# Patient Record
Sex: FEMALE | Race: WHITE | HISPANIC OR LATINO | URBAN - METROPOLITAN AREA
[De-identification: names, ages, dates, MRNs, and addresses within clinical notes are randomized per-mention and may not be internally consistent; named-entity substitution may affect disease eponyms.]

---

## 2017-02-14 ENCOUNTER — OUTPATIENT (OUTPATIENT)
Dept: OUTPATIENT SERVICES | Facility: HOSPITAL | Age: 22
LOS: 1 days | End: 2017-02-14
Payer: COMMERCIAL

## 2017-02-14 DIAGNOSIS — Z01.818 ENCOUNTER FOR OTHER PREPROCEDURAL EXAMINATION: ICD-10-CM

## 2017-02-14 DIAGNOSIS — O34.219 MATERNAL CARE FOR UNSPECIFIED TYPE SCAR FROM PREVIOUS CESAREAN DELIVERY: ICD-10-CM

## 2017-02-14 LAB
APPEARANCE UR: CLEAR — SIGNIFICANT CHANGE UP
BASOPHILS # BLD AUTO: 0 K/UL — SIGNIFICANT CHANGE UP (ref 0–0.2)
BASOPHILS NFR BLD AUTO: 0.1 % — SIGNIFICANT CHANGE UP (ref 0–2)
BILIRUB UR-MCNC: NEGATIVE — SIGNIFICANT CHANGE UP
BLD GP AB SCN SERPL QL: SIGNIFICANT CHANGE UP
COLOR SPEC: YELLOW — SIGNIFICANT CHANGE UP
COMMENT - BLOOD BANK: SIGNIFICANT CHANGE UP
DIFF PNL FLD: NEGATIVE — SIGNIFICANT CHANGE UP
EOSINOPHIL # BLD AUTO: 0 K/UL — SIGNIFICANT CHANGE UP (ref 0–0.5)
EOSINOPHIL NFR BLD AUTO: 0.4 % — SIGNIFICANT CHANGE UP (ref 0–6)
GLUCOSE UR QL: NEGATIVE MG/DL — SIGNIFICANT CHANGE UP
HCT VFR BLD CALC: 37.9 % — SIGNIFICANT CHANGE UP (ref 37–47)
HGB BLD-MCNC: 13 G/DL — SIGNIFICANT CHANGE UP (ref 12–16)
KETONES UR-MCNC: NEGATIVE — SIGNIFICANT CHANGE UP
LEUKOCYTE ESTERASE UR-ACNC: NEGATIVE — SIGNIFICANT CHANGE UP
LYMPHOCYTES # BLD AUTO: 1.9 K/UL — SIGNIFICANT CHANGE UP (ref 1–4.8)
LYMPHOCYTES # BLD AUTO: 17.7 % — LOW (ref 20–55)
MCHC RBC-ENTMCNC: 33 PG — HIGH (ref 27–31)
MCHC RBC-ENTMCNC: 34.3 G/DL — SIGNIFICANT CHANGE UP (ref 32–36)
MCV RBC AUTO: 96.2 FL — SIGNIFICANT CHANGE UP (ref 81–99)
MONOCYTES # BLD AUTO: 0.8 K/UL — SIGNIFICANT CHANGE UP (ref 0–0.8)
MONOCYTES NFR BLD AUTO: 7.7 % — SIGNIFICANT CHANGE UP (ref 3–10)
NEUTROPHILS # BLD AUTO: 7.8 K/UL — SIGNIFICANT CHANGE UP (ref 1.8–8)
NEUTROPHILS NFR BLD AUTO: 73.7 % — HIGH (ref 37–73)
NITRITE UR-MCNC: NEGATIVE — SIGNIFICANT CHANGE UP
PH UR: 7 — SIGNIFICANT CHANGE UP (ref 4.8–8)
PLATELET # BLD AUTO: 153 K/UL — SIGNIFICANT CHANGE UP (ref 150–400)
PROT UR-MCNC: NEGATIVE MG/DL — SIGNIFICANT CHANGE UP
RBC # BLD: 3.94 M/UL — LOW (ref 4.4–5.2)
RBC # FLD: 13.3 % — SIGNIFICANT CHANGE UP (ref 11–15.6)
SP GR SPEC: 1 — LOW (ref 1.01–1.02)
TYPE + AB SCN PNL BLD: SIGNIFICANT CHANGE UP
UROBILINOGEN FLD QL: NEGATIVE MG/DL — SIGNIFICANT CHANGE UP
WBC # BLD: 10.6 K/UL — SIGNIFICANT CHANGE UP (ref 4.8–10.8)
WBC # FLD AUTO: 10.6 K/UL — SIGNIFICANT CHANGE UP (ref 4.8–10.8)

## 2017-02-14 PROCEDURE — 85027 COMPLETE CBC AUTOMATED: CPT

## 2017-02-14 PROCEDURE — 86850 RBC ANTIBODY SCREEN: CPT

## 2017-02-14 PROCEDURE — 81003 URINALYSIS AUTO W/O SCOPE: CPT

## 2017-02-14 PROCEDURE — 86901 BLOOD TYPING SEROLOGIC RH(D): CPT

## 2017-02-14 PROCEDURE — 36415 COLL VENOUS BLD VENIPUNCTURE: CPT

## 2017-02-14 PROCEDURE — 86900 BLOOD TYPING SEROLOGIC ABO: CPT

## 2017-02-15 DIAGNOSIS — O34.219 MATERNAL CARE FOR UNSPECIFIED TYPE SCAR FROM PREVIOUS CESAREAN DELIVERY: ICD-10-CM

## 2017-02-15 DIAGNOSIS — Z01.818 ENCOUNTER FOR OTHER PREPROCEDURAL EXAMINATION: ICD-10-CM

## 2017-02-26 ENCOUNTER — TRANSCRIPTION ENCOUNTER (OUTPATIENT)
Age: 22
End: 2017-02-26

## 2017-02-27 ENCOUNTER — INPATIENT (INPATIENT)
Facility: HOSPITAL | Age: 22
LOS: 2 days | Discharge: HOME | End: 2017-03-02
Attending: OBSTETRICS & GYNECOLOGY | Admitting: OBSTETRICS & GYNECOLOGY
Payer: COMMERCIAL

## 2017-02-27 VITALS — WEIGHT: 145.51 LBS

## 2017-02-27 DIAGNOSIS — O34.219 MATERNAL CARE FOR UNSPECIFIED TYPE SCAR FROM PREVIOUS CESAREAN DELIVERY: ICD-10-CM

## 2017-02-27 LAB
BASE EXCESS BLDCOV CALC-SCNC: 0.1 MMOL/L — SIGNIFICANT CHANGE UP (ref -9.3–0.3)
BASOPHILS # BLD AUTO: 0 K/UL — SIGNIFICANT CHANGE UP (ref 0–0.2)
BASOPHILS NFR BLD AUTO: 0.1 % — SIGNIFICANT CHANGE UP (ref 0–2)
BLD GP AB SCN SERPL QL: SIGNIFICANT CHANGE UP
GAS PNL BLDCOV: 7.33 — SIGNIFICANT CHANGE UP (ref 7.11–7.36)
HCO3 BLDCOV-SCNC: 27 MMOL/L — HIGH (ref 17–25)
HCT VFR BLD CALC: 35.1 % — LOW (ref 37–47)
HGB BLD-MCNC: 12.4 G/DL — SIGNIFICANT CHANGE UP (ref 12–16)
LYMPHOCYTES # BLD AUTO: 1.1 K/UL — SIGNIFICANT CHANGE UP (ref 1–4.8)
LYMPHOCYTES # BLD AUTO: 6.1 % — LOW (ref 20–55)
MCHC RBC-ENTMCNC: 33.6 PG — HIGH (ref 27–31)
MCHC RBC-ENTMCNC: 35.3 G/DL — SIGNIFICANT CHANGE UP (ref 32–36)
MCV RBC AUTO: 95.1 FL — SIGNIFICANT CHANGE UP (ref 81–99)
MONOCYTES # BLD AUTO: 0.6 K/UL — SIGNIFICANT CHANGE UP (ref 0–0.8)
MONOCYTES NFR BLD AUTO: 3.1 % — SIGNIFICANT CHANGE UP (ref 3–10)
NEUTROPHILS # BLD AUTO: 16 K/UL — HIGH (ref 1.8–8)
NEUTROPHILS NFR BLD AUTO: 90.5 % — HIGH (ref 37–73)
PCO2 BLDCOV: 51 MMHG — HIGH (ref 27–49.4)
PLAT MORPH BLD: NORMAL — SIGNIFICANT CHANGE UP
PLATELET # BLD AUTO: 133 K/UL — LOW (ref 150–400)
PO2 BLDCOA: 18.9 MMHG — SIGNIFICANT CHANGE UP (ref 17.4–41)
RBC # BLD: 3.69 M/UL — LOW (ref 4.4–5.2)
RBC # FLD: 12.6 % — SIGNIFICANT CHANGE UP (ref 11–15.6)
RBC BLD AUTO: NORMAL — SIGNIFICANT CHANGE UP
RPR SERPL-ACNC: SIGNIFICANT CHANGE UP
SAO2 % BLDCOV: SIGNIFICANT CHANGE UP
TYPE + AB SCN PNL BLD: SIGNIFICANT CHANGE UP
WBC # BLD: 17.7 K/UL — HIGH (ref 4.8–10.8)
WBC # FLD AUTO: 17.7 K/UL — HIGH (ref 4.8–10.8)

## 2017-02-27 RX ORDER — ACETAMINOPHEN 500 MG
650 TABLET ORAL EVERY 6 HOURS
Qty: 0 | Refills: 0 | Status: DISCONTINUED | OUTPATIENT
Start: 2017-02-27 | End: 2017-02-27

## 2017-02-27 RX ORDER — OXYTOCIN 10 UNIT/ML
41.67 VIAL (ML) INJECTION
Qty: 20 | Refills: 0 | Status: DISCONTINUED | OUTPATIENT
Start: 2017-02-27 | End: 2017-03-02

## 2017-02-27 RX ORDER — GLYCERIN ADULT
1 SUPPOSITORY, RECTAL RECTAL AT BEDTIME
Qty: 0 | Refills: 0 | Status: DISCONTINUED | OUTPATIENT
Start: 2017-02-27 | End: 2017-02-27

## 2017-02-27 RX ORDER — IBUPROFEN 200 MG
600 TABLET ORAL EVERY 6 HOURS
Qty: 0 | Refills: 0 | Status: DISCONTINUED | OUTPATIENT
Start: 2017-02-27 | End: 2017-02-27

## 2017-02-27 RX ORDER — SODIUM CHLORIDE 9 MG/ML
1000 INJECTION, SOLUTION INTRAVENOUS ONCE
Qty: 0 | Refills: 0 | Status: COMPLETED | OUTPATIENT
Start: 2017-02-27 | End: 2017-02-27

## 2017-02-27 RX ORDER — DIPHENHYDRAMINE HCL 50 MG
25 CAPSULE ORAL ONCE
Qty: 0 | Refills: 0 | Status: DISCONTINUED | OUTPATIENT
Start: 2017-02-27 | End: 2017-03-02

## 2017-02-27 RX ORDER — IBUPROFEN 200 MG
600 TABLET ORAL EVERY 6 HOURS
Qty: 0 | Refills: 0 | Status: DISCONTINUED | OUTPATIENT
Start: 2017-02-27 | End: 2017-03-02

## 2017-02-27 RX ORDER — OXYTOCIN 10 UNIT/ML
41.67 VIAL (ML) INJECTION
Qty: 20 | Refills: 0 | Status: DISCONTINUED | OUTPATIENT
Start: 2017-02-27 | End: 2017-02-27

## 2017-02-27 RX ORDER — LANOLIN
1 OINTMENT (GRAM) TOPICAL
Qty: 0 | Refills: 0 | Status: DISCONTINUED | OUTPATIENT
Start: 2017-02-27 | End: 2017-03-02

## 2017-02-27 RX ORDER — CEFAZOLIN SODIUM 1 G
2000 VIAL (EA) INJECTION ONCE
Qty: 0 | Refills: 0 | Status: COMPLETED | OUTPATIENT
Start: 2017-02-27 | End: 2017-02-27

## 2017-02-27 RX ORDER — KETOROLAC TROMETHAMINE 30 MG/ML
30 SYRINGE (ML) INJECTION EVERY 6 HOURS
Qty: 0 | Refills: 0 | Status: DISCONTINUED | OUTPATIENT
Start: 2017-02-27 | End: 2017-02-27

## 2017-02-27 RX ORDER — DIPHENHYDRAMINE HCL 50 MG
25 CAPSULE ORAL EVERY 6 HOURS
Qty: 0 | Refills: 0 | Status: DISCONTINUED | OUTPATIENT
Start: 2017-02-27 | End: 2017-03-02

## 2017-02-27 RX ORDER — METOCLOPRAMIDE HCL 10 MG
10 TABLET ORAL ONCE
Qty: 0 | Refills: 0 | Status: DISCONTINUED | OUTPATIENT
Start: 2017-02-27 | End: 2017-02-27

## 2017-02-27 RX ORDER — SODIUM CHLORIDE 9 MG/ML
1000 INJECTION, SOLUTION INTRAVENOUS
Qty: 0 | Refills: 0 | Status: DISCONTINUED | OUTPATIENT
Start: 2017-02-27 | End: 2017-03-02

## 2017-02-27 RX ORDER — LANOLIN
1 OINTMENT (GRAM) TOPICAL
Qty: 0 | Refills: 0 | Status: DISCONTINUED | OUTPATIENT
Start: 2017-02-27 | End: 2017-02-27

## 2017-02-27 RX ORDER — FERROUS SULFATE 325(65) MG
325 TABLET ORAL DAILY
Qty: 0 | Refills: 0 | Status: DISCONTINUED | OUTPATIENT
Start: 2017-02-27 | End: 2017-02-27

## 2017-02-27 RX ORDER — ACETAMINOPHEN 500 MG
1000 TABLET ORAL ONCE
Qty: 0 | Refills: 0 | Status: COMPLETED | OUTPATIENT
Start: 2017-02-27 | End: 2017-02-27

## 2017-02-27 RX ORDER — SODIUM CHLORIDE 9 MG/ML
1000 INJECTION, SOLUTION INTRAVENOUS
Qty: 0 | Refills: 0 | Status: DISCONTINUED | OUTPATIENT
Start: 2017-02-27 | End: 2017-02-27

## 2017-02-27 RX ORDER — DIPHENHYDRAMINE HCL 50 MG
25 CAPSULE ORAL EVERY 6 HOURS
Qty: 0 | Refills: 0 | Status: DISCONTINUED | OUTPATIENT
Start: 2017-02-27 | End: 2017-02-27

## 2017-02-27 RX ORDER — TETANUS TOXOID, REDUCED DIPHTHERIA TOXOID AND ACELLULAR PERTUSSIS VACCINE, ADSORBED 5; 2.5; 8; 8; 2.5 [IU]/.5ML; [IU]/.5ML; UG/.5ML; UG/.5ML; UG/.5ML
0.5 SUSPENSION INTRAMUSCULAR ONCE
Qty: 0 | Refills: 0 | Status: DISCONTINUED | OUTPATIENT
Start: 2017-02-27 | End: 2017-03-02

## 2017-02-27 RX ORDER — SIMETHICONE 80 MG/1
80 TABLET, CHEWABLE ORAL EVERY 4 HOURS
Qty: 0 | Refills: 0 | Status: DISCONTINUED | OUTPATIENT
Start: 2017-02-27 | End: 2017-02-27

## 2017-02-27 RX ORDER — OXYTOCIN 10 UNIT/ML
333.33 VIAL (ML) INJECTION
Qty: 20 | Refills: 0 | Status: DISCONTINUED | OUTPATIENT
Start: 2017-02-27 | End: 2017-03-02

## 2017-02-27 RX ORDER — SIMETHICONE 80 MG/1
80 TABLET, CHEWABLE ORAL EVERY 4 HOURS
Qty: 0 | Refills: 0 | Status: DISCONTINUED | OUTPATIENT
Start: 2017-02-27 | End: 2017-03-02

## 2017-02-27 RX ORDER — CITRIC ACID/SODIUM CITRATE 300-500 MG
30 SOLUTION, ORAL ORAL ONCE
Qty: 0 | Refills: 0 | Status: COMPLETED | OUTPATIENT
Start: 2017-02-27 | End: 2017-02-27

## 2017-02-27 RX ORDER — ONDANSETRON 8 MG/1
4 TABLET, FILM COATED ORAL EVERY 6 HOURS
Qty: 0 | Refills: 0 | Status: DISCONTINUED | OUTPATIENT
Start: 2017-02-27 | End: 2017-03-02

## 2017-02-27 RX ORDER — DOCUSATE SODIUM 100 MG
100 CAPSULE ORAL
Qty: 0 | Refills: 0 | Status: DISCONTINUED | OUTPATIENT
Start: 2017-02-27 | End: 2017-03-02

## 2017-02-27 RX ORDER — FERROUS SULFATE 325(65) MG
325 TABLET ORAL DAILY
Qty: 0 | Refills: 0 | Status: DISCONTINUED | OUTPATIENT
Start: 2017-02-27 | End: 2017-03-02

## 2017-02-27 RX ORDER — DIPHENHYDRAMINE HCL 50 MG
25 CAPSULE ORAL EVERY 4 HOURS
Qty: 0 | Refills: 0 | Status: DISCONTINUED | OUTPATIENT
Start: 2017-02-27 | End: 2017-03-02

## 2017-02-27 RX ORDER — KETOROLAC TROMETHAMINE 30 MG/ML
30 SYRINGE (ML) INJECTION ONCE
Qty: 0 | Refills: 0 | Status: DISCONTINUED | OUTPATIENT
Start: 2017-02-27 | End: 2017-02-27

## 2017-02-27 RX ORDER — ONDANSETRON 8 MG/1
4 TABLET, FILM COATED ORAL ONCE
Qty: 0 | Refills: 0 | Status: DISCONTINUED | OUTPATIENT
Start: 2017-02-27 | End: 2017-02-27

## 2017-02-27 RX ORDER — ACETAMINOPHEN 500 MG
650 TABLET ORAL EVERY 6 HOURS
Qty: 0 | Refills: 0 | Status: DISCONTINUED | OUTPATIENT
Start: 2017-02-27 | End: 2017-03-02

## 2017-02-27 RX ORDER — DOCUSATE SODIUM 100 MG
100 CAPSULE ORAL
Qty: 0 | Refills: 0 | Status: DISCONTINUED | OUTPATIENT
Start: 2017-02-27 | End: 2017-02-27

## 2017-02-27 RX ORDER — GLYCERIN ADULT
1 SUPPOSITORY, RECTAL RECTAL AT BEDTIME
Qty: 0 | Refills: 0 | Status: DISCONTINUED | OUTPATIENT
Start: 2017-02-27 | End: 2017-03-02

## 2017-02-27 RX ORDER — NALOXONE HYDROCHLORIDE 4 MG/.1ML
0.1 SPRAY NASAL
Qty: 0 | Refills: 0 | Status: DISCONTINUED | OUTPATIENT
Start: 2017-02-27 | End: 2017-03-02

## 2017-02-27 RX ADMIN — Medication 125 MILLIUNIT(S)/MIN: at 08:54

## 2017-02-27 RX ADMIN — Medication 325 MILLIGRAM(S): at 16:47

## 2017-02-27 RX ADMIN — SODIUM CHLORIDE 2000 MILLILITER(S): 9 INJECTION, SOLUTION INTRAVENOUS at 07:22

## 2017-02-27 RX ADMIN — Medication 30 MILLILITER(S): at 06:43

## 2017-02-27 RX ADMIN — Medication 400 MILLIGRAM(S): at 09:58

## 2017-02-27 RX ADMIN — Medication 1 TABLET(S): at 16:46

## 2017-02-27 RX ADMIN — SODIUM CHLORIDE 125 MILLILITER(S): 9 INJECTION, SOLUTION INTRAVENOUS at 17:24

## 2017-02-27 RX ADMIN — Medication 1000 MILLIUNIT(S)/MIN: at 08:54

## 2017-02-27 RX ADMIN — Medication 100 MILLIGRAM(S): at 07:53

## 2017-02-27 RX ADMIN — Medication 1000 MILLIGRAM(S): at 10:15

## 2017-02-27 RX ADMIN — Medication 125 MILLIUNIT(S)/MIN: at 12:23

## 2017-02-27 NOTE — DISCHARGE NOTE OB - CARE PLAN
Principal Discharge DX:	 delivery delivered  Goal:	Healthy Mother and Baby  Instructions for follow-up, activity and diet:	- Recommended early ambulation, adequate hydration and a balanced diet. Mother-baby interaction also encouraged and breast feeding ad ethan.    - Pelvic rest × 6 weeks  - Postpartum suture/staple removal in 4-5 days  - Postpartum check in 4–6 weeks with PMD

## 2017-02-27 NOTE — DISCHARGE NOTE OB - HOSPITAL COURSE
20yo now  had a repeat C/S at 39 wks. Labor course was without any complications and delivery was uncomplicated. Patient did well in recovery and was transferred to post partum floor. Post partum course was unremarkable. Patient to follow up at [facility] in 4 to 5 days for suture/staple removal and in 6 weeks for postpartum check up. Patient is in medically satisfactory condition for discharged home.

## 2017-02-27 NOTE — DISCHARGE NOTE OB - CARE PROVIDER_API CALL
Fredrick Major (MD), Obstetrics and Gynecology  [address] [place] [address]  Phone: [phone]  Fax: [phone]

## 2017-02-27 NOTE — DISCHARGE NOTE OB - MEDICATION SUMMARY - MEDICATIONS TO TAKE
I will START or STAY ON the medications listed below when I get home from the hospital:    Prenatal Multivitamins with Folic Acid 1 mg oral tablet  -- 1 tab(s) by mouth once a day  -- Indication: For Supplementation

## 2017-02-27 NOTE — DISCHARGE NOTE OB - PLAN OF CARE
Healthy Mother and Baby - Recommended early ambulation, adequate hydration and a balanced diet. Mother-baby interaction also encouraged and breast feeding ad ethan.    - Pelvic rest × 6 weeks  - Postpartum suture/staple removal in 4-5 days  - Postpartum check in 4–6 weeks with PMD

## 2017-02-27 NOTE — DISCHARGE NOTE OB - PATIENT PORTAL LINK FT
“You can access the [facility] Patient Portal, offered by [facility], by registering with the following website: [email or web address]”

## 2017-02-28 RX ADMIN — Medication 600 MILLIGRAM(S): at 20:40

## 2017-02-28 RX ADMIN — Medication 325 MILLIGRAM(S): at 13:43

## 2017-02-28 RX ADMIN — Medication 600 MILLIGRAM(S): at 20:06

## 2017-02-28 RX ADMIN — Medication 1 TABLET(S): at 13:43

## 2017-02-28 NOTE — PROGRESS NOTE ADULT - SUBJECTIVE AND OBJECTIVE BOX
PGY 2 Progress Note:     21 year old  s/p repeat c/section at 39 weeks is POD #1.  Patient had nguyen.  She is ambulating, +flatus, tolerating PO intake well.  Patient is breast feeding.     ROS: pt denies any calf pain, denies any chest pain, denies any palpitations or SOB     Vital Signs Last 24 Hrs  T(C): 36.8, Max: 36.9 ( @ 00:18)  T(F): 98.2, Max: 98.4 ( @ 00:18)  HR: 79 (61 - 88)  BP: 115/82 (95/50 - 122/78)  BP(mean): --  RR: 18 (16 - 20)  SpO2: 99% (97% - 100%)    MEDICATIONS  (STANDING):  oxytocin Infusion 333.333milliUNIT(s)/Min IV Continuous <Continuous>  lactated ringers. 1000milliLiter(s) IV Continuous <Continuous>  diphtheria/tetanus/pertussis (acellular) Vaccine (ADAcel) 0.5milliLiter(s) IntraMuscular once  oxytocin Infusion 41.667milliUNIT(s)/Min IV Continuous <Continuous>  ferrous    sulfate 325milliGRAM(s) Oral daily  prenatal multivitamin 1Tablet(s) Oral daily  lactated ringers. 1000milliLiter(s) IV Continuous <Continuous>  diphtheria/tetanus/pertussis (acellular) Vaccine (ADAcel) 0.5milliLiter(s) IntraMuscular once    MEDICATIONS  (PRN):  diphenhydrAMINE   Injectable 25milliGRAM(s) IV Push once PRN Rash and/or Itching  naloxone Injectable 0.1milliGRAM(s) IV Push every 3 minutes PRN For ANY of the following changes in patient status:  A. RR LESS THAN 10 breaths per minute, B. Oxygen saturation LESS THAN 90%, C. Sedation score of 6  ondansetron Injectable 4milliGRAM(s) IV Push every 6 hours PRN Nausea  diphenhydrAMINE   Capsule 25milliGRAM(s) Oral every 4 hours PRN Pruritus  ketorolac   Injectable 30milliGRAM(s) IV Push every 6 hours PRN Moderate Pain (4 - 6)  acetaminophen   Tablet 650milliGRAM(s) Oral every 6 hours PRN For Temp greater than 38.5 C (101.3 F)  ibuprofen  Tablet 600milliGRAM(s) Oral every 6 hours PRN Mild pain or headache  oxyCODONE  5 mG/acetaminophen 325 mG 1Tablet(s) Oral every 3 hours PRN Moderate Pain  oxyCODONE  5 mG/acetaminophen 325 mG 2Tablet(s) Oral every 6 hours PRN Severe Pain  simethicone 80milliGRAM(s) Chew every 4 hours PRN Gas  diphenhydrAMINE   Capsule 25milliGRAM(s) Oral every 6 hours PRN Itching  glycerin Suppository - Adult 1Suppository(s) Rectal at bedtime PRN Constipation  docusate sodium 100milliGRAM(s) Oral two times a day PRN Stool Softening  lanolin Ointment 1Application(s) Topical every 3 hours PRN Sore Nipples      Physical Exam:   General: NAD   HEENT: AT/NC   CV: S1, S2   Resp: clear bilateral   Abd: soft, NT, ND, uterus at level of umbilicus   Ext: no b/l calf tenderness   Skin: staples in tact, incision clean, dry, non erythematous

## 2017-02-28 NOTE — PROGRESS NOTE ADULT - PROBLEM SELECTOR PLAN 1
continue current management   d/c nguyen   f/u AM CBC   encourage mother/baby bonding, encourage breast feeding

## 2017-02-28 NOTE — PROGRESS NOTE ADULT - ATTENDING COMMENTS
Post op day # 1  s/p repeat   pain controlled  out of bed  uterus contracted   wound intact  For routine post op / post partum care

## 2017-03-01 LAB
BASOPHILS # BLD AUTO: 0 K/UL — SIGNIFICANT CHANGE UP (ref 0–0.2)
BASOPHILS NFR BLD AUTO: 0.2 % — SIGNIFICANT CHANGE UP (ref 0–2)
EOSINOPHIL # BLD AUTO: 0.2 K/UL — SIGNIFICANT CHANGE UP (ref 0–0.5)
EOSINOPHIL NFR BLD AUTO: 1.3 % — SIGNIFICANT CHANGE UP (ref 0–6)
HCOV 229E RNA SPEC QL NAA+PROBE: DETECTED
HCT VFR BLD CALC: 34.2 % — LOW (ref 37–47)
HGB BLD-MCNC: 11.9 G/DL — LOW (ref 12–16)
LYMPHOCYTES # BLD AUTO: 17.2 % — LOW (ref 20–55)
LYMPHOCYTES # BLD AUTO: 2.1 K/UL — SIGNIFICANT CHANGE UP (ref 1–4.8)
MCHC RBC-ENTMCNC: 33.6 PG — HIGH (ref 27–31)
MCHC RBC-ENTMCNC: 34.8 G/DL — SIGNIFICANT CHANGE UP (ref 32–36)
MCV RBC AUTO: 96.6 FL — SIGNIFICANT CHANGE UP (ref 81–99)
MONOCYTES # BLD AUTO: 1 K/UL — HIGH (ref 0–0.8)
MONOCYTES NFR BLD AUTO: 8.4 % — SIGNIFICANT CHANGE UP (ref 3–10)
NEUTROPHILS # BLD AUTO: 8.9 K/UL — HIGH (ref 1.8–8)
NEUTROPHILS NFR BLD AUTO: 72.6 % — SIGNIFICANT CHANGE UP (ref 37–73)
PLATELET # BLD AUTO: 121 K/UL — LOW (ref 150–400)
RAPID RVP RESULT: DETECTED
RBC # BLD: 3.54 M/UL — LOW (ref 4.4–5.2)
RBC # FLD: 13.5 % — SIGNIFICANT CHANGE UP (ref 11–15.6)
WBC # BLD: 12.3 K/UL — HIGH (ref 4.8–10.8)
WBC # FLD AUTO: 12.3 K/UL — HIGH (ref 4.8–10.8)

## 2017-03-01 RX ADMIN — Medication 325 MILLIGRAM(S): at 12:34

## 2017-03-01 RX ADMIN — Medication 1 TABLET(S): at 12:34

## 2017-03-01 NOTE — PROGRESS NOTE ADULT - SUBJECTIVE AND OBJECTIVE BOX
PGY 2 Progress Note:     21 year old  s/p repeat c/section at 39 weeks is POD #2.  She is ambulating, +flatus, tolerating PO intake well.  Patient is breast feeding.  Pt c/o dry non productive cough.  No rhinorrhea, no sick contacts.      ROS: pt denies any calf pain, denies any chest pain, denies any palpitations or SOB     Vital Signs Last 24 Hrs  T(C): 36.7, Max: 36.7 ( @ 07:25)  T(F): 98.1, Max: 98.1 ( @ 07:25)  HR: 82 (66 - 82)  BP: 110/77 (110/71 - 110/77)  BP(mean): --  RR: 18 (18 - 18)  SpO2: 99% (99% - 99%)        Physical Exam:   General: NAD   HEENT: AT/NC   CV: S1, S2   Resp: clear bilateral   Abd: soft, NT, ND, uterus at level of umbilicus   Ext: no b/l calf tenderness   Skin: staples in tact, incision clean, dry, non erythematous     MEDICATIONS  (STANDING):  oxytocin Infusion 333.333milliUNIT(s)/Min IV Continuous <Continuous>  lactated ringers. 1000milliLiter(s) IV Continuous <Continuous>  diphtheria/tetanus/pertussis (acellular) Vaccine (ADAcel) 0.5milliLiter(s) IntraMuscular once  oxytocin Infusion 41.667milliUNIT(s)/Min IV Continuous <Continuous>  ferrous    sulfate 325milliGRAM(s) Oral daily  prenatal multivitamin 1Tablet(s) Oral daily  lactated ringers. 1000milliLiter(s) IV Continuous <Continuous>  diphtheria/tetanus/pertussis (acellular) Vaccine (ADAcel) 0.5milliLiter(s) IntraMuscular once    MEDICATIONS  (PRN):  diphenhydrAMINE   Injectable 25milliGRAM(s) IV Push once PRN Rash and/or Itching  naloxone Injectable 0.1milliGRAM(s) IV Push every 3 minutes PRN For ANY of the following changes in patient status:  A. RR LESS THAN 10 breaths per minute, B. Oxygen saturation LESS THAN 90%, C. Sedation score of 6  ondansetron Injectable 4milliGRAM(s) IV Push every 6 hours PRN Nausea  diphenhydrAMINE   Capsule 25milliGRAM(s) Oral every 4 hours PRN Pruritus  acetaminophen   Tablet 650milliGRAM(s) Oral every 6 hours PRN For Temp greater than 38.5 C (101.3 F)  ibuprofen  Tablet 600milliGRAM(s) Oral every 6 hours PRN Mild pain or headache  oxyCODONE  5 mG/acetaminophen 325 mG 1Tablet(s) Oral every 3 hours PRN Moderate Pain  oxyCODONE  5 mG/acetaminophen 325 mG 2Tablet(s) Oral every 6 hours PRN Severe Pain  simethicone 80milliGRAM(s) Chew every 4 hours PRN Gas  diphenhydrAMINE   Capsule 25milliGRAM(s) Oral every 6 hours PRN Itching  glycerin Suppository - Adult 1Suppository(s) Rectal at bedtime PRN Constipation  docusate sodium 100milliGRAM(s) Oral two times a day PRN Stool Softening  lanolin Ointment 1Application(s) Topical every 3 hours PRN Sore Nipples PGY 2 Progress Note:     21 year old  s/p repeat c/section at 39 weeks is POD #2.  She is ambulating, +flatus, tolerating PO intake well.  Patient is breast feeding.       ROS: pt denies any calf pain, denies any chest pain, denies any palpitations or SOB     Vital Signs Last 24 Hrs  T(C): 36.7, Max: 36.7 ( @ 07:25)  T(F): 98.1, Max: 98.1 ( @ 07:25)  HR: 82 (66 - 82)  BP: 110/77 (110/71 - 110/77)  BP(mean): --  RR: 18 (18 - 18)  SpO2: 99% (99% - 99%)        Physical Exam:   General: NAD   HEENT: AT/NC   CV: S1, S2   Resp: clear bilateral   Abd: soft, NT, ND, uterus at level of umbilicus   Ext: no b/l calf tenderness   Skin: staples in tact, incision clean, dry, non erythematous     MEDICATIONS  (STANDING):  oxytocin Infusion 333.333milliUNIT(s)/Min IV Continuous <Continuous>  lactated ringers. 1000milliLiter(s) IV Continuous <Continuous>  diphtheria/tetanus/pertussis (acellular) Vaccine (ADAcel) 0.5milliLiter(s) IntraMuscular once  oxytocin Infusion 41.667milliUNIT(s)/Min IV Continuous <Continuous>  ferrous    sulfate 325milliGRAM(s) Oral daily  prenatal multivitamin 1Tablet(s) Oral daily  lactated ringers. 1000milliLiter(s) IV Continuous <Continuous>  diphtheria/tetanus/pertussis (acellular) Vaccine (ADAcel) 0.5milliLiter(s) IntraMuscular once    MEDICATIONS  (PRN):  diphenhydrAMINE   Injectable 25milliGRAM(s) IV Push once PRN Rash and/or Itching  naloxone Injectable 0.1milliGRAM(s) IV Push every 3 minutes PRN For ANY of the following changes in patient status:  A. RR LESS THAN 10 breaths per minute, B. Oxygen saturation LESS THAN 90%, C. Sedation score of 6  ondansetron Injectable 4milliGRAM(s) IV Push every 6 hours PRN Nausea  diphenhydrAMINE   Capsule 25milliGRAM(s) Oral every 4 hours PRN Pruritus  acetaminophen   Tablet 650milliGRAM(s) Oral every 6 hours PRN For Temp greater than 38.5 C (101.3 F)  ibuprofen  Tablet 600milliGRAM(s) Oral every 6 hours PRN Mild pain or headache  oxyCODONE  5 mG/acetaminophen 325 mG 1Tablet(s) Oral every 3 hours PRN Moderate Pain  oxyCODONE  5 mG/acetaminophen 325 mG 2Tablet(s) Oral every 6 hours PRN Severe Pain  simethicone 80milliGRAM(s) Chew every 4 hours PRN Gas  diphenhydrAMINE   Capsule 25milliGRAM(s) Oral every 6 hours PRN Itching  glycerin Suppository - Adult 1Suppository(s) Rectal at bedtime PRN Constipation  docusate sodium 100milliGRAM(s) Oral two times a day PRN Stool Softening  lanolin Ointment 1Application(s) Topical every 3 hours PRN Sore Nipples

## 2017-03-01 NOTE — PROGRESS NOTE ADULT - ATTENDING COMMENTS
Post op day # 2  s/p repeat  at 39 wks  uncomplicated course  no complaints  exam within limits  labs reviewed  24 hr discharge notice

## 2017-03-02 VITALS
SYSTOLIC BLOOD PRESSURE: 105 MMHG | DIASTOLIC BLOOD PRESSURE: 64 MMHG | RESPIRATION RATE: 18 BRPM | HEART RATE: 89 BPM | TEMPERATURE: 98 F

## 2017-03-02 PROCEDURE — T1013: CPT

## 2017-03-02 PROCEDURE — 87486 CHLMYD PNEUM DNA AMP PROBE: CPT

## 2017-03-02 PROCEDURE — 86901 BLOOD TYPING SEROLOGIC RH(D): CPT

## 2017-03-02 PROCEDURE — 82803 BLOOD GASES ANY COMBINATION: CPT

## 2017-03-02 PROCEDURE — 36415 COLL VENOUS BLD VENIPUNCTURE: CPT

## 2017-03-02 PROCEDURE — 87798 DETECT AGENT NOS DNA AMP: CPT

## 2017-03-02 PROCEDURE — 59050 FETAL MONITOR W/REPORT: CPT

## 2017-03-02 PROCEDURE — 87633 RESP VIRUS 12-25 TARGETS: CPT

## 2017-03-02 PROCEDURE — 85027 COMPLETE CBC AUTOMATED: CPT

## 2017-03-02 PROCEDURE — 86900 BLOOD TYPING SEROLOGIC ABO: CPT

## 2017-03-02 PROCEDURE — 87581 M.PNEUMON DNA AMP PROBE: CPT

## 2017-03-02 PROCEDURE — 86850 RBC ANTIBODY SCREEN: CPT

## 2017-03-02 PROCEDURE — 86592 SYPHILIS TEST NON-TREP QUAL: CPT

## 2017-03-02 NOTE — PROGRESS NOTE ADULT - SUBJECTIVE AND OBJECTIVE BOX
PGY 2 Progress Note:     21 year old  s/p repeat c/section at 39 weeks is POD #3.  She is ambulating, +flatus, tolerating PO intake well.  Patient is breast feeding.       ROS: pt denies any calf pain, denies any chest pain, denies any palpitations or SOB     Vital Signs Last 24 Hrs  T(C): 36.4, Max: 36.6 ( @ 07:40)  T(F): 97.5, Max: 97.9 ( @ 07:40)  HR: 100 (94 - 100)  BP: 115/83 (114/74 - 115/83)  BP(mean): --  RR: 18 (18 - 18)  SpO2: 98% (98% - 98%)      Physical Exam:   General: NAD   HEENT: AT/NC   CV: S1, S2   Resp: clear bilateral   Abd: soft, NT, ND, uterus at level of umbilicus   Ext: no b/l calf tenderness   Skin: staples in tact, incision clean, dry, non erythematous     MEDICATIONS  (STANDING):  oxytocin Infusion 333.333milliUNIT(s)/Min IV Continuous <Continuous>  lactated ringers. 1000milliLiter(s) IV Continuous <Continuous>  diphtheria/tetanus/pertussis (acellular) Vaccine (ADAcel) 0.5milliLiter(s) IntraMuscular once  oxytocin Infusion 41.667milliUNIT(s)/Min IV Continuous <Continuous>  ferrous    sulfate 325milliGRAM(s) Oral daily  prenatal multivitamin 1Tablet(s) Oral daily  lactated ringers. 1000milliLiter(s) IV Continuous <Continuous>  diphtheria/tetanus/pertussis (acellular) Vaccine (ADAcel) 0.5milliLiter(s) IntraMuscular once    MEDICATIONS  (PRN):  diphenhydrAMINE   Injectable 25milliGRAM(s) IV Push once PRN Rash and/or Itching  naloxone Injectable 0.1milliGRAM(s) IV Push every 3 minutes PRN For ANY of the following changes in patient status:  A. RR LESS THAN 10 breaths per minute, B. Oxygen saturation LESS THAN 90%, C. Sedation score of 6  ondansetron Injectable 4milliGRAM(s) IV Push every 6 hours PRN Nausea  diphenhydrAMINE   Capsule 25milliGRAM(s) Oral every 4 hours PRN Pruritus  acetaminophen   Tablet 650milliGRAM(s) Oral every 6 hours PRN For Temp greater than 38.5 C (101.3 F)  ibuprofen  Tablet 600milliGRAM(s) Oral every 6 hours PRN Mild pain or headache  oxyCODONE  5 mG/acetaminophen 325 mG 1Tablet(s) Oral every 3 hours PRN Moderate Pain  oxyCODONE  5 mG/acetaminophen 325 mG 2Tablet(s) Oral every 6 hours PRN Severe Pain  simethicone 80milliGRAM(s) Chew every 4 hours PRN Gas  diphenhydrAMINE   Capsule 25milliGRAM(s) Oral every 6 hours PRN Itching  glycerin Suppository - Adult 1Suppository(s) Rectal at bedtime PRN Constipation  docusate sodium 100milliGRAM(s) Oral two times a day PRN Stool Softening  lanolin Ointment 1Application(s) Topical every 3 hours PRN Sore Nipples

## 2017-03-02 NOTE — PROGRESS NOTE ADULT - PROBLEM SELECTOR PLAN 1
continue current management    encourage mother/baby bonding, encourage breast feeding.  plan to DC home today, f/u at [facility] for incision check within 4 days of discharge

## 2022-05-12 ENCOUNTER — APPOINTMENT (OUTPATIENT)
Dept: ANTEPARTUM | Facility: CLINIC | Age: 27
End: 2022-05-12
Payer: MEDICAID

## 2022-05-12 ENCOUNTER — ASOB RESULT (OUTPATIENT)
Age: 27
End: 2022-05-12

## 2022-05-12 PROBLEM — Z00.00 ENCOUNTER FOR PREVENTIVE HEALTH EXAMINATION: Status: ACTIVE | Noted: 2022-05-12

## 2022-05-12 PROCEDURE — 76801 OB US < 14 WKS SINGLE FETUS: CPT

## 2022-05-24 ENCOUNTER — APPOINTMENT (OUTPATIENT)
Dept: MATERNAL FETAL MEDICINE | Facility: CLINIC | Age: 27
End: 2022-05-24
Payer: MEDICAID

## 2022-05-24 ENCOUNTER — APPOINTMENT (OUTPATIENT)
Dept: ANTEPARTUM | Facility: CLINIC | Age: 27
End: 2022-05-24
Payer: MEDICAID

## 2022-05-24 ENCOUNTER — ASOB RESULT (OUTPATIENT)
Age: 27
End: 2022-05-24

## 2022-05-24 VITALS
RESPIRATION RATE: 18 BRPM | BODY MASS INDEX: 28.17 KG/M2 | OXYGEN SATURATION: 98 % | WEIGHT: 159 LBS | HEIGHT: 63 IN | DIASTOLIC BLOOD PRESSURE: 60 MMHG | SYSTOLIC BLOOD PRESSURE: 110 MMHG | HEART RATE: 89 BPM

## 2022-05-24 DIAGNOSIS — O35.1XX0 MATERNAL CARE FOR (SUSPECTED) CHROMOSOMAL ABNORMALITY IN FETUS, NOT APPLICABLE OR UNSPECIFIED: ICD-10-CM

## 2022-05-24 DIAGNOSIS — O34.219 MATERNAL CARE FOR UNSPECIFIED TYPE SCAR FROM PREVIOUS CESAREAN DELIVERY: ICD-10-CM

## 2022-05-24 DIAGNOSIS — Z3A.13 13 WEEKS GESTATION OF PREGNANCY: ICD-10-CM

## 2022-05-24 DIAGNOSIS — O09.212 SUPERVISION OF PREGNANCY WITH HISTORY OF PRE-TERM LABOR, SECOND TRIMESTER: ICD-10-CM

## 2022-05-24 DIAGNOSIS — O09.292 SUPERVISION OF PREGNANCY WITH OTHER POOR REPRODUCTIVE OR OBSTETRIC HISTORY, SECOND TRIMESTER: ICD-10-CM

## 2022-05-24 DIAGNOSIS — Z87.51 PERSONAL HISTORY OF PRE-TERM LABOR: ICD-10-CM

## 2022-05-24 DIAGNOSIS — Z87.59 PERSONAL HISTORY OF OTHER COMPLICATIONS OF PREGNANCY, CHILDBIRTH AND THE PUERPERIUM: ICD-10-CM

## 2022-05-24 PROCEDURE — ZZZZZ: CPT

## 2022-05-24 PROCEDURE — 76813 OB US NUCHAL MEAS 1 GEST: CPT

## 2022-05-24 PROCEDURE — 99203 OFFICE O/P NEW LOW 30 MIN: CPT | Mod: TH

## 2022-05-24 RX ORDER — VITAMIN C, CALCIUM, IRON, VITAMIN D3, VITAMIN E, VITAMIN B1, VITAMIN B2, VITAMIN B3, VITAMIN B6, FOLIC ACID, IODINE, ZINC, COPPER, DOCUSATE SODIUM, DOCOSAHEXAENOIC ACID (DHA) 27-1-50 MG
KIT ORAL
Refills: 0 | Status: ACTIVE | COMMUNITY

## 2022-05-24 NOTE — DATA REVIEWED
[FreeTextEntry1] : Ultrascreen obtained today. NT is normal for gestational age. \par \par I reviewed [facility] hisotry including a  delivery in , with PPROM at 34 weeks. She delivered by . Her next pregnancy in 2017 went to full term delivering by repeat . \par \par I specifically asked Erica if there were any interventions during her recent pregnancy such as Marybeth injections, vaginal progesterone, or admissions for  labor.  All were denied. \par \par Since her last pregnancy was uneventful, it will be appropriate to follow that same regimen, and be conservative. \par \par A cervical length will be obtained at the Level 2, but othersie we will follow symptoms

## 2022-05-24 NOTE — DISCUSSION/SUMMARY
[FreeTextEntry1] : Level 2 sonogram at 20 weeks. \par \par Cervical length at that time. \par \par Signs and symptoms of  labor were reviewed.

## 2022-05-27 LAB
1ST TRIMESTER DATA: NORMAL
ADDENDUM DOC: NORMAL
AFP PNL SERPL: NORMAL
AFP SERPL-ACNC: NORMAL
CLINICAL BIOCHEMIST REVIEW: NORMAL
FREE BETA HCG 1ST TRIMESTER: NORMAL
Lab: NORMAL
NOTES NTD: NORMAL
NT: NORMAL
PAPP-A SERPL-ACNC: NORMAL
TRISOMY 18/3: NORMAL

## 2022-06-30 ENCOUNTER — ASOB RESULT (OUTPATIENT)
Age: 27
End: 2022-06-30

## 2022-06-30 ENCOUNTER — APPOINTMENT (OUTPATIENT)
Dept: ANTEPARTUM | Facility: CLINIC | Age: 27
End: 2022-06-30

## 2022-06-30 PROCEDURE — 76817 TRANSVAGINAL US OBSTETRIC: CPT

## 2022-06-30 PROCEDURE — 76815 OB US LIMITED FETUS(S): CPT

## 2022-07-07 ENCOUNTER — APPOINTMENT (OUTPATIENT)
Dept: ANTEPARTUM | Facility: CLINIC | Age: 27
End: 2022-07-07

## 2022-07-07 ENCOUNTER — ASOB RESULT (OUTPATIENT)
Age: 27
End: 2022-07-07

## 2022-07-07 PROCEDURE — 76811 OB US DETAILED SNGL FETUS: CPT

## 2022-07-07 PROCEDURE — 76817 TRANSVAGINAL US OBSTETRIC: CPT | Mod: 59

## 2022-07-14 ENCOUNTER — ASOB RESULT (OUTPATIENT)
Age: 27
End: 2022-07-14

## 2022-07-14 ENCOUNTER — APPOINTMENT (OUTPATIENT)
Dept: ANTEPARTUM | Facility: CLINIC | Age: 27
End: 2022-07-14

## 2022-07-14 PROCEDURE — 76817 TRANSVAGINAL US OBSTETRIC: CPT

## 2022-07-14 PROCEDURE — 76816 OB US FOLLOW-UP PER FETUS: CPT

## 2022-07-26 ENCOUNTER — APPOINTMENT (OUTPATIENT)
Dept: ANTEPARTUM | Facility: CLINIC | Age: 27
End: 2022-07-26

## 2022-07-26 ENCOUNTER — ASOB RESULT (OUTPATIENT)
Age: 27
End: 2022-07-26

## 2022-07-26 PROCEDURE — 76817 TRANSVAGINAL US OBSTETRIC: CPT

## 2022-08-09 ENCOUNTER — ASOB RESULT (OUTPATIENT)
Age: 27
End: 2022-08-09

## 2022-08-09 ENCOUNTER — APPOINTMENT (OUTPATIENT)
Dept: ANTEPARTUM | Facility: CLINIC | Age: 27
End: 2022-08-09

## 2022-08-09 PROCEDURE — 76817 TRANSVAGINAL US OBSTETRIC: CPT

## 2022-09-20 ENCOUNTER — APPOINTMENT (OUTPATIENT)
Dept: ANTEPARTUM | Facility: CLINIC | Age: 27
End: 2022-09-20

## 2022-09-21 ENCOUNTER — APPOINTMENT (OUTPATIENT)
Dept: ANTEPARTUM | Facility: CLINIC | Age: 27
End: 2022-09-21

## 2022-09-21 ENCOUNTER — ASOB RESULT (OUTPATIENT)
Age: 27
End: 2022-09-21

## 2022-09-21 PROCEDURE — 76816 OB US FOLLOW-UP PER FETUS: CPT

## 2022-11-02 ENCOUNTER — ASOB RESULT (OUTPATIENT)
Age: 27
End: 2022-11-02

## 2022-11-02 ENCOUNTER — APPOINTMENT (OUTPATIENT)
Dept: ANTEPARTUM | Facility: CLINIC | Age: 27
End: 2022-11-02

## 2022-11-02 PROCEDURE — 76816 OB US FOLLOW-UP PER FETUS: CPT
